# Patient Record
Sex: FEMALE | Race: WHITE | NOT HISPANIC OR LATINO | Employment: FULL TIME | URBAN - METROPOLITAN AREA
[De-identification: names, ages, dates, MRNs, and addresses within clinical notes are randomized per-mention and may not be internally consistent; named-entity substitution may affect disease eponyms.]

---

## 2017-01-05 ENCOUNTER — OFFICE VISIT (OUTPATIENT)
Dept: FAMILY MEDICINE | Facility: CLINIC | Age: 62
End: 2017-01-05
Payer: COMMERCIAL

## 2017-01-05 VITALS
DIASTOLIC BLOOD PRESSURE: 72 MMHG | SYSTOLIC BLOOD PRESSURE: 120 MMHG | OXYGEN SATURATION: 100 % | TEMPERATURE: 97.6 F | HEIGHT: 66 IN | HEART RATE: 78 BPM | WEIGHT: 168 LBS | BODY MASS INDEX: 27 KG/M2

## 2017-01-05 DIAGNOSIS — I10 BENIGN ESSENTIAL HYPERTENSION: Primary | ICD-10-CM

## 2017-01-05 PROCEDURE — 36415 COLL VENOUS BLD VENIPUNCTURE: CPT | Performed by: PREVENTIVE MEDICINE

## 2017-01-05 PROCEDURE — 80048 BASIC METABOLIC PNL TOTAL CA: CPT | Performed by: PREVENTIVE MEDICINE

## 2017-01-05 PROCEDURE — 99214 OFFICE O/P EST MOD 30 MIN: CPT | Performed by: PREVENTIVE MEDICINE

## 2017-01-05 NOTE — MR AVS SNAPSHOT
"              After Visit Summary   1/5/2017    Josy Kc    MRN: [id number]           Patient Information     Date Of Birth          1955        Visit Information        Provider Department      1/5/2017 2:00 PM Eleno Mcneill MD [facility]         Follow-ups after your visit        Who to contact     If you have questions or need follow up information about today's clinic visit or your schedule please contact [facility] directly at [phone].  Normal or non-critical lab and imaging results will be communicated to you by MyChart, letter or phone within 4 business days after the clinic has received the results. If you do not hear from us within 7 days, please contact the clinic through [employer] or phone. If you have a critical or abnormal lab result, we will notify you by phone as soon as possible.  Submit refill requests through [employer] or call your pharmacy and they will forward the refill request to us. Please allow 3 business days for your refill to be completed.          Additional Information About Your Visit        MyChart Information     [employer] gives you secure access to your electronic health record. If you see a primary care provider, you can also send messages to your care team and make appointments. If you have questions, please call your primary care clinic.  If you do not have a primary care provider, please call [phone] and they will assist you.        Care EveryWhere ID     This is your Care EveryWhere ID. This could be used by other organizations to access your [place] medical records  [id number]        Your Vitals Were     Pulse Temperature Height BMI (Body Mass Index) Pulse Oximetry Breastfeeding?    78 97.6  F (36.4  C) (Oral) 5' 5.75\" (1.67 m) 27.32 kg/m2 100% No       Blood Pressure from Last 3 Encounters:   01/05/17 120/72   11/16/16 162/84   11/11/16 160/80    Weight from Last 3 Encounters:   01/05/17 168 lb (76.204 kg) "   11/16/16 171 lb (77.565 kg)   11/11/16 172 lb (78.019 kg)              Today, you had the following     No orders found for display       Primary Care Provider Office Phone # Fax #    Eleno Yanes Vijay Mcneill [phone]       [facility] [address] [facility] [address]  [facility] [place] [address]        Thank you!     Thank you for choosing [facility]  for your care. Our goal is always to provide you with excellent care. Hearing back from our patients is one way we can continue to improve our services. Please take a few minutes to complete the written survey that you may receive in the mail after your visit with us. Thank you!             Your Updated Medication List - Protect others around you: Learn how to safely use, store and throw away your medicines at www.disposemymeds.org.          This list is accurate as of: 1/5/17  2:11 PM.  Always use your most recent med list.                   Brand Name Dispense Instructions for use    lisinopril-hydrochlorothiazide 20-12.5 MG per tablet    PRINZIDE/ZESTORETIC    90 tablet    TAKE 1 TABLET BY MOUTH EVERY DAY

## 2017-01-05 NOTE — PROGRESS NOTES
"SUBJECTIVE:  Josy Kc, a 61 year old female scheduled an appointment to discuss the following issues:  Benign essential hypertension  Pt here for follow up feels fine    Medical, social, surgical, and family histories reviewed.    ROS:  C: NEGATIVE for fever, chills  E: NEGATIVE for vision changes   R: NEGATIVE for significant cough or SOB  CV: NEGATIVE for chest pain, palpitations   GI: NEGATIVE for nausea, abdominal pain, heartburn, or change in bowel habits  : NEGATIVE for frequency, dysuria, or hematuria  M: NEGATIVE for significant arthralgias or myalgia  N: NEGATIVE for weakness, dizziness or paresthesias or headache    OBJECTIVE:  /72 mmHg  Pulse 78  Temp(Src) 97.6  F (36.4  C) (Oral)  Ht 5' 5.75\" (1.67 m)  Wt 168 lb (76.204 kg)  BMI 27.32 kg/m2  SpO2 100%  Breastfeeding? No  EXAM:  GENERAL APPEARANCE: healthy, alert and no distress  EYES: EOMI,  PERRL  HENT: ear canals and TM's normal and nose and mouth without ulcers or lesions  RESP: lungs clear to auscultation - no rales, rhonchi or wheezes  CV: regular rates and rhythm, normal S1 S2, no S3 or S4 and no murmur, click or rub -  ABDOMEN:  soft, nontender, no HSM or masses and bowel sounds normal    ASSESSMENT/PLAN:  (I10) Benign essential hypertension  (primary encounter diagnosis)  Plan: Basic metabolic panel  Recheck bmp today  Controlled, continue current medicine - lisinopril-hctz (20-12.5) daily    Advised mammogram, dexa - declined by pt    25 minutes spent with patient, over 50% time counseling, coordinating care and explaining about nature of the patient's conditions.    All risks, benefits of treatment and further evaluation was reviewed with patient.  Pt expressed understanding.  Pt was in agreement with this plan.  Eleno Mcneill MD    "

## 2017-01-05 NOTE — NURSING NOTE
"Chief Complaint   Patient presents with     Hypertension       Initial /72 mmHg  Pulse 78  Temp(Src) 97.6  F (36.4  C) (Oral)  Ht 5' 5.75\" (1.67 m)  Wt 168 lb (76.204 kg)  BMI 27.32 kg/m2  SpO2 100%  Breastfeeding? No Estimated body mass index is 27.32 kg/(m^2) as calculated from the following:    Height as of this encounter: 5' 5.75\" (1.67 m).    Weight as of this encounter: 168 lb (76.204 kg).  BP completed using cuff size: regular right arm  Chayo Garfield- CMA      "

## 2017-01-06 LAB
ANION GAP SERPL CALCULATED.3IONS-SCNC: 8 MMOL/L (ref 3–14)
BUN SERPL-MCNC: 24 MG/DL (ref 7–30)
CALCIUM SERPL-MCNC: 9 MG/DL (ref 8.5–10.1)
CHLORIDE SERPL-SCNC: 101 MMOL/L (ref 94–109)
CO2 SERPL-SCNC: 30 MMOL/L (ref 20–32)
CREAT SERPL-MCNC: 1.18 MG/DL (ref 0.52–1.04)
GFR SERPL CREATININE-BSD FRML MDRD: 46 ML/MIN/1.7M2
GLUCOSE SERPL-MCNC: 92 MG/DL (ref 70–99)
POTASSIUM SERPL-SCNC: 3.7 MMOL/L (ref 3.4–5.3)
SODIUM SERPL-SCNC: 139 MMOL/L (ref 133–144)

## 2017-01-07 ENCOUNTER — TELEPHONE (OUTPATIENT)
Dept: FAMILY MEDICINE | Facility: CLINIC | Age: 62
End: 2017-01-07

## 2017-01-07 DIAGNOSIS — I10 ESSENTIAL HYPERTENSION: Primary | ICD-10-CM

## 2017-01-07 RX ORDER — LISINOPRIL/HYDROCHLOROTHIAZIDE 10-12.5 MG
1 TABLET ORAL DAILY
Qty: 90 TABLET | Refills: 0 | Status: SHIPPED | OUTPATIENT
Start: 2017-01-07 | End: 2017-02-03

## 2017-01-09 RX ORDER — HYDROCHLOROTHIAZIDE 12.5 MG/1
12.5 TABLET ORAL DAILY
Qty: 90 TABLET | Refills: 1 | Status: SHIPPED | OUTPATIENT
Start: 2017-01-09 | End: 2017-02-03

## 2017-01-24 ENCOUNTER — TELEPHONE (OUTPATIENT)
Dept: FAMILY MEDICINE | Facility: CLINIC | Age: 62
End: 2017-01-24

## 2017-01-24 DIAGNOSIS — I10 BENIGN ESSENTIAL HYPERTENSION: Primary | ICD-10-CM

## 2017-01-24 NOTE — TELEPHONE ENCOUNTER
Reason for Call:  Other call back    Detailed comments: Pt is requesting lab orders for Creatine, BUM and sodium. States according to her mychart it said she needs these to be drawn. Pt is coming in tomorrow for bp check and is hoping to get these done as well    Phone Number Patient can be reached at: Home number on file [phone] (home)    Best Time: anytime    Can we leave a detailed message on this number? YES    Call taken on 1/24/2017 at 1:11 PM by Enriqueta Forte

## 2017-01-25 ENCOUNTER — TRANSFERRED RECORDS (OUTPATIENT)
Dept: HEALTH INFORMATION MANAGEMENT | Facility: CLINIC | Age: 62
End: 2017-01-25

## 2017-01-25 ENCOUNTER — ALLIED HEALTH/NURSE VISIT (OUTPATIENT)
Dept: NURSING | Facility: CLINIC | Age: 62
End: 2017-01-25
Payer: COMMERCIAL

## 2017-01-25 VITALS
DIASTOLIC BLOOD PRESSURE: 83 MMHG | HEIGHT: 68 IN | TEMPERATURE: 97.9 F | OXYGEN SATURATION: 100 % | HEART RATE: 72 BPM | SYSTOLIC BLOOD PRESSURE: 148 MMHG | BODY MASS INDEX: 25.07 KG/M2 | WEIGHT: 165.4 LBS

## 2017-01-25 DIAGNOSIS — I10 BENIGN ESSENTIAL HYPERTENSION: ICD-10-CM

## 2017-01-25 LAB
ANION GAP SERPL CALCULATED.3IONS-SCNC: 9 MMOL/L (ref 3–14)
BUN SERPL-MCNC: 18 MG/DL (ref 7–30)
CALCIUM SERPL-MCNC: 9.3 MG/DL (ref 8.5–10.1)
CHLORIDE SERPL-SCNC: 107 MMOL/L (ref 94–109)
CO2 SERPL-SCNC: 27 MMOL/L (ref 20–32)
CREAT SERPL-MCNC: 0.96 MG/DL (ref 0.52–1.04)
GFR SERPL CREATININE-BSD FRML MDRD: 59 ML/MIN/1.7M2
GLUCOSE SERPL-MCNC: 99 MG/DL (ref 70–99)
POTASSIUM SERPL-SCNC: 4.5 MMOL/L (ref 3.4–5.3)
SODIUM SERPL-SCNC: 143 MMOL/L (ref 133–144)

## 2017-01-25 PROCEDURE — 80048 BASIC METABOLIC PNL TOTAL CA: CPT | Performed by: PREVENTIVE MEDICINE

## 2017-01-25 PROCEDURE — 36415 COLL VENOUS BLD VENIPUNCTURE: CPT | Performed by: PREVENTIVE MEDICINE

## 2017-01-25 PROCEDURE — 99207 ZZC NO CHARGE NURSE ONLY: CPT

## 2017-02-03 ENCOUNTER — OFFICE VISIT (OUTPATIENT)
Dept: FAMILY MEDICINE | Facility: CLINIC | Age: 62
End: 2017-02-03
Payer: COMMERCIAL

## 2017-02-03 VITALS
BODY MASS INDEX: 25.82 KG/M2 | OXYGEN SATURATION: 99 % | HEIGHT: 68 IN | SYSTOLIC BLOOD PRESSURE: 130 MMHG | WEIGHT: 170.4 LBS | TEMPERATURE: 97.9 F | HEART RATE: 79 BPM | DIASTOLIC BLOOD PRESSURE: 80 MMHG

## 2017-02-03 DIAGNOSIS — I10 BENIGN ESSENTIAL HYPERTENSION: Primary | ICD-10-CM

## 2017-02-03 PROCEDURE — 99214 OFFICE O/P EST MOD 30 MIN: CPT | Performed by: PREVENTIVE MEDICINE

## 2017-02-03 NOTE — MR AVS SNAPSHOT
"              After Visit Summary   2/3/2017    Josy Kc    MRN: [id number]           Patient Information     Date Of Birth          1955        Visit Information        Provider Department      2/3/2017 1:30 PM Eleno Mcneill MD [facility]        Today's Diagnoses     Visit for screening mammogram    -  1        Follow-ups after your visit        Who to contact     If you have questions or need follow up information about today's clinic visit or your schedule please contact [facility] directly at [phone].  Normal or non-critical lab and imaging results will be communicated to you by [employer], letter or phone within 4 business days after the clinic has received the results. If you do not hear from us within 7 days, please contact the clinic through [employer] or phone. If you have a critical or abnormal lab result, we will notify you by phone as soon as possible.  Submit refill requests through [employer] or call your pharmacy and they will forward the refill request to us. Please allow 3 business days for your refill to be completed.          Additional Information About Your Visit        MyChart Information     [employer] gives you secure access to your electronic health record. If you see a primary care provider, you can also send messages to your care team and make appointments. If you have questions, please call your primary care clinic.  If you do not have a primary care provider, please call [phone] and they will assist you.        Care EveryWhere ID     This is your Care EveryWhere ID. This could be used by other organizations to access your [place] medical records  [id number]        Your Vitals Were     Pulse Temperature Height BMI (Body Mass Index) Pulse Oximetry Breastfeeding?    79 97.9  F (36.6  C) (Oral) 5' 7.75\" (1.721 m) 26.10 kg/m2 99% No       Blood Pressure from Last 3 Encounters:   02/03/17 152/80   01/25/17 148/83   01/05/17 120/72 "    Weight from Last 3 Encounters:   02/03/17 170 lb 6.4 oz (77.293 kg)   01/25/17 165 lb 6.4 oz (75.025 kg)   01/05/17 168 lb (76.204 kg)              Today, you had the following     No orders found for display       Primary Care Provider Office Phone # Fax #    Eleno Yanes Vijay Mcneill [phone]       [facility] [address] [facility] [address] [facility] [address]  [facility] [address]        Thank you!     Thank you for choosing [facility]  for your care. Our goal is always to provide you with excellent care. Hearing back from our patients is one way we can continue to improve our services. Please take a few minutes to complete the written survey that you may receive in the mail after your visit with us. Thank you!             Your Updated Medication List - Protect others around you: Learn how to safely use, store and throw away your medicines at www.disposemymeds.org.          This list is accurate as of: 2/3/17  1:32 PM.  Always use your most recent med list.                   Brand Name Dispense Instructions for use    hydrochlorothiazide 12.5 MG Tabs tablet     90 tablet    Take 1 tablet (12.5 mg) by mouth daily       lisinopril-hydrochlorothiazide 10-12.5 MG per tablet    PRINZIDE/ZESTORETIC    90 tablet    Take 1 tablet by mouth daily

## 2017-02-03 NOTE — PROGRESS NOTES
"SUBJECTIVE:  Josy Kc, a 61 year old female scheduled an appointment to discuss the following issues:  Benign essential hypertension  Pt here for follow up  BP at home has been 130/80 on average off meds    Medical, social, surgical, and family histories reviewed.    ROS:  C: NEGATIVE for fever, chills  E: NEGATIVE for vision changes   R: NEGATIVE for significant cough or SOB  CV: NEGATIVE for chest pain, palpitations   GI: NEGATIVE for nausea, abdominal pain, heartburn, or change in bowel habits  : NEGATIVE for frequency, dysuria, or hematuria  M: NEGATIVE for significant arthralgias or myalgia  N: NEGATIVE for weakness, dizziness or paresthesias or headache    OBJECTIVE:  /80 mmHg  Pulse 79  Temp(Src) 97.9  F (36.6  C) (Oral)  Ht 5' 7.75\" (1.721 m)  Wt 170 lb 6.4 oz (77.293 kg)  BMI 26.10 kg/m2  SpO2 99%  Breastfeeding? No  EXAM:  GENERAL APPEARANCE: healthy, alert and no distress  EYES: EOMI,  PERRL  HENT: ear canals and TM's normal and nose and mouth without ulcers or lesions  RESP: lungs clear to auscultation - no rales, rhonchi or wheezes  CV: regular rates and rhythm, normal S1 S2, no S3 or S4 and no murmur, click or rub -  ABDOMEN:  soft, nontender, no HSM or masses and bowel sounds normal    ASSESSMENT/PLAN:  (I10) Benign essential hypertension  (primary encounter diagnosis)  bp stable off meds, advise continued monitoring, contact me in bp goes above 140/90 on average  DASH diet  Regular cardiovascular exercise      25 minutes spent with patient, over 50% time counseling, coordinating care and explaining about nature of the patient's conditions.    All risks, benefits of treatment and further evaluation was reviewed with patient.  Pt expressed understanding.  Pt was in agreement with this plan.  Eleno Mcneill MD    "

## 2017-02-03 NOTE — NURSING NOTE
"Chief Complaint   Patient presents with     Follow Up For     Blood Pressure       Initial /80 mmHg  Pulse 79  Temp(Src) 97.9  F (36.6  C) (Oral)  Ht 5' 7.75\" (1.721 m)  Wt 170 lb 6.4 oz (77.293 kg)  BMI 26.10 kg/m2  SpO2 99%  Breastfeeding? No Estimated body mass index is 26.1 kg/(m^2) as calculated from the following:    Height as of this encounter: 5' 7.75\" (1.721 m).    Weight as of this encounter: 170 lb 6.4 oz (77.293 kg).  BP completed using cuff size: large(L)    KATIE Klein MA February 3, 2017 1:30 PM      "

## 2017-02-21 ENCOUNTER — TRANSFERRED RECORDS (OUTPATIENT)
Dept: HEALTH INFORMATION MANAGEMENT | Facility: CLINIC | Age: 62
End: 2017-02-21

## 2017-02-27 ENCOUNTER — OFFICE VISIT (OUTPATIENT)
Dept: FAMILY MEDICINE | Facility: CLINIC | Age: 62
End: 2017-02-27
Payer: COMMERCIAL

## 2017-02-27 VITALS
SYSTOLIC BLOOD PRESSURE: 158 MMHG | BODY MASS INDEX: 27.48 KG/M2 | HEIGHT: 66 IN | WEIGHT: 171 LBS | DIASTOLIC BLOOD PRESSURE: 95 MMHG | TEMPERATURE: 97.4 F | OXYGEN SATURATION: 100 % | HEART RATE: 83 BPM

## 2017-02-27 DIAGNOSIS — R07.89 CHEST HEAVINESS: Primary | ICD-10-CM

## 2017-02-27 DIAGNOSIS — I10 BENIGN ESSENTIAL HYPERTENSION: ICD-10-CM

## 2017-02-27 PROCEDURE — 99213 OFFICE O/P EST LOW 20 MIN: CPT | Performed by: NURSE PRACTITIONER

## 2017-02-27 NOTE — MR AVS SNAPSHOT
After Visit Summary   2/27/2017    Josy Kc    MRN: [id number]           Patient Information     Date Of Birth          1955        Visit Information        Provider Department      2/27/2017 2:00 PM Char Zhang APRN CNP [facility]        Care Instructions    Begin taking zantac 150mg twice a day for a couple of months  I suggest taking a low dose of lisinopril 10mg daily and follow up with Dr Mcneill in one month  Also call me if you find you need a new prescription           Follow-ups after your visit        Who to contact     If you have questions or need follow up information about today's clinic visit or your schedule please contact [facility] directly at [phone].  Normal or non-critical lab and imaging results will be communicated to you by [employer]hart, letter or phone within 4 business days after the clinic has received the results. If you do not hear from us within 7 days, please contact the clinic through [employer]hart or phone. If you have a critical or abnormal lab result, we will notify you by phone as soon as possible.  Submit refill requests through [employer] or call your pharmacy and they will forward the refill request to us. Please allow 3 business days for your refill to be completed.          Additional Information About Your Visit        MyChart Information     [employer] gives you secure access to your electronic health record. If you see a primary care provider, you can also send messages to your care team and make appointments. If you have questions, please call your primary care clinic.  If you do not have a primary care provider, please call [phone] and they will assist you.        Care EveryWhere ID     This is your Care EveryWhere ID. This could be used by other organizations to access your [place] medical records  [id number]        Your Vitals Were     Pulse Temperature Height Pulse Oximetry BMI (Body Mass Index)       83 97.4  " F (36.3  C) (Tympanic) 5' 5.75\" (1.67 m) 100% 27.81 kg/m2        Blood Pressure from Last 3 Encounters:   02/27/17 (!) 158/95   02/03/17 130/80   01/25/17 148/83    Weight from Last 3 Encounters:   02/27/17 171 lb (77.6 kg)   02/03/17 170 lb 6.4 oz (77.3 kg)   01/25/17 165 lb 6.4 oz (75 kg)              Today, you had the following     No orders found for display       Primary Care Provider Office Phone # Fax #    Eleno Joaquín Mcneill [phone]       [facility] [address] [address]        Thank you!     Thank you for choosing [facility]  for your care. Our goal is always to provide you with excellent care. Hearing back from our patients is one way we can continue to improve our services. Please take a few minutes to complete the written survey that you may receive in the mail after your visit with us. Thank you!             Your Updated Medication List - Protect others around you: Learn how to safely use, store and throw away your medicines at www.disposemymeds.org.      Notice  As of 2/27/2017  2:36 PM    You have not been prescribed any medications.      "

## 2017-02-27 NOTE — PROGRESS NOTES
SUBJECTIVE:                                                    Josy Kc is a 61 year old female who presents to clinic today for the following health issues:      Chest Heaviness      Onset: since November    Description (location/character/radiation/duration): between breasts, 2 to 10 times per day and lasts 2 min to 2 hours; no chest heaviness currently    Intensity:  mild    Accompanying signs and symptoms:        Shortness of breath: no        Sweating: no        Nausea/vomitting: no        Palpitations: YES       Other (fevers/chills/cough/heartburn/lightheadedness): no     History (similar episodes/previous evaluation):hypertension currently on no anithypertensives .  Was taking combo lisinopril /HCTZ  10/12.5 but dropped her BP too low and recently outside the clinic she feels her BP is in normal range so has discontinued med     Precipitating or alleviating factors:       Worse with exertion: no ; in fact an avid exerciser and has no limitations, or exercise induced chest heaviness, pain or SOB; no hx of asthma         Worse with breathing: YES- when taking deep breaths       Related to eating: no        Better with burping: no     Therapies tried and outcome: None       Problem list and histories reviewed & adjusted, as indicated.  Additional history: as documented    Patient Active Problem List   Diagnosis     CARDIOVASCULAR SCREENING; LDL GOAL LESS THAN 160     S/P hysterectomy     Benign essential hypertension     Past Surgical History   Procedure Laterality Date     Hysterectomy, pap no longer indicated       C nonspecific procedure       vein stripping     Hysterectomy, pap no longer indicated         Social History   Substance Use Topics     Smoking status: Former Smoker     Packs/day: 1.00     Years: 10.00     Types: Cigarettes     Smokeless tobacco: Never Used     Alcohol use 0.0 oz/week     0 Standard drinks or equivalent per week      Comment: 1 drink 5 days per week     Family  "History   Problem Relation Age of Onset     DIABETES Sister      C.A.D. Mother       chf, 97     Hypertension Mother      Neurologic Disorder Father       60, aneurysm     CANCER Paternal Aunt      ?ovarian/colon     CANCER Paternal Aunt      ?ovarian/colon     CANCER Paternal Aunt      ?ovarian/colon     Breast Cancer Sister          No current outpatient prescriptions on file.     Allergies   Allergen Reactions     No Known Allergies        ROS:  Constitutional, HEENT, cardiovascular, pulmonary, gi and gu systems are negative, except as otherwise noted.    OBJECTIVE:                                                    BP (!) 158/95  Pulse 83  Temp 97.4  F (36.3  C) (Tympanic)  Ht 5' 5.75\" (1.67 m)  Wt 171 lb (77.6 kg)  SpO2 100%  BMI 27.81 kg/m2  Body mass index is 27.81 kg/(m^2).  GENERAL: healthy, alert and no distress,EYES: Eyes grossly normal to inspection, PERRL and conjunctivae and sclerae normal  HENT: ear canals and TM's normal, nose and mouth without ulcers or lesions  NECK: no adenopathy, no asymmetry, masses, or scars and thyroid normal to palpation  RESP: lungs clear to auscultation - no rales, rhonchi or wheezes  CV: regular rate and rhythm, normal S1 S2, no S3 or S4, no murmur, click or rub, no peripheral edema and peripheral pulses strong  ABDOMEN: soft, nontender, no hepatosplenomegaly, no masses and bowel sounds normal  MS: no gross musculoskeletal defects noted, no edema  PSYCH: mentation appears normal, affect normal/  Diagnostic Test Results:  none      ASSESSMENT/PLAN:                                                        ICD-10-CM    1. Chest heaviness R07.89    2. Benign essential hypertension I10    I suggested a 24 hour ambulatory monitor but she has checked into cost and because she could not be given a definite cost she has decided not to do it  She exercises without cardiac symptoms and has no pulmonary symptoms  I suspect this is related to either reflux or uncontrolled " hypertension  Patient Instructions   Begin taking zantac 150mg twice a day for a couple of months- pt declines to do this at this time  I suggest taking a low dose of lisinopril 10mg daily and follow up with Dr Mcneill in one month  Also call me if you find you need a new prescription   Please follow up with  once you have resumed low dose lisinopril and / or zantac or if symtoms worsen or change      NATO [facility]

## 2017-02-27 NOTE — NURSING NOTE
"Chief Complaint   Patient presents with     Chest Pain       Initial BP (!) 158/95  Pulse 83  Temp 97.4  F (36.3  C) (Tympanic)  Ht 5' 5.75\" (1.67 m)  Wt 171 lb (77.6 kg)  SpO2 100%  BMI 27.81 kg/m2 Estimated body mass index is 27.81 kg/(m^2) as calculated from the following:    Height as of this encounter: 5' 5.75\" (1.67 m).    Weight as of this encounter: 171 lb (77.6 kg).  Medication Reconciliation: complete   Shahida Harris MA    "

## 2017-02-27 NOTE — PATIENT INSTRUCTIONS
Begin taking zantac 150mg twice a day for a couple of months  I suggest taking a low dose of lisinopril 10mg daily and follow up with Dr Mcneill in one month  Also call me if you find you need a new prescription

## 2020-03-02 ENCOUNTER — HEALTH MAINTENANCE LETTER (OUTPATIENT)
Age: 65
End: 2020-03-02

## 2020-12-20 ENCOUNTER — HEALTH MAINTENANCE LETTER (OUTPATIENT)
Age: 65
End: 2020-12-20

## 2021-04-24 ENCOUNTER — HEALTH MAINTENANCE LETTER (OUTPATIENT)
Age: 66
End: 2021-04-24

## 2021-10-03 ENCOUNTER — HEALTH MAINTENANCE LETTER (OUTPATIENT)
Age: 66
End: 2021-10-03

## 2021-10-28 ENCOUNTER — APPOINTMENT (OUTPATIENT)
Dept: URBAN - METROPOLITAN AREA CLINIC 259 | Age: 66
Setting detail: DERMATOLOGY
End: 2021-10-29

## 2021-10-28 DIAGNOSIS — L81.4 OTHER MELANIN HYPERPIGMENTATION: ICD-10-CM

## 2021-10-28 DIAGNOSIS — L82.1 OTHER SEBORRHEIC KERATOSIS: ICD-10-CM

## 2021-10-28 DIAGNOSIS — L57.8 OTHER SKIN CHANGES DUE TO CHRONIC EXPOSURE TO NONIONIZING RADIATION: ICD-10-CM

## 2021-10-28 PROCEDURE — OTHER PRESCRIPTION MEDICATION MANAGEMENT: OTHER

## 2021-10-28 PROCEDURE — OTHER COUNSELING: OTHER

## 2021-10-28 PROCEDURE — OTHER MIPS QUALITY: OTHER

## 2021-10-28 PROCEDURE — 99203 OFFICE O/P NEW LOW 30 MIN: CPT

## 2021-10-28 ASSESSMENT — LOCATION ZONE DERM
LOCATION ZONE: TRUNK
LOCATION ZONE: FACE

## 2021-10-28 ASSESSMENT — LOCATION DETAILED DESCRIPTION DERM
LOCATION DETAILED: RIGHT CENTRAL MALAR CHEEK
LOCATION DETAILED: LEFT RIB CAGE
LOCATION DETAILED: LEFT CENTRAL MALAR CHEEK
LOCATION DETAILED: EPIGASTRIC SKIN

## 2021-10-28 ASSESSMENT — LOCATION SIMPLE DESCRIPTION DERM
LOCATION SIMPLE: LEFT CHEEK
LOCATION SIMPLE: RIGHT CHEEK
LOCATION SIMPLE: ABDOMEN

## 2021-10-28 NOTE — PROCEDURE: MIPS QUALITY
Quality 130: Documentation Of Current Medications In The Medical Record: Current Medications Documented
Quality 110: Preventive Care And Screening: Influenza Immunization: Influenza Immunization Ordered or Recommended, but not Administered due to system reason
Quality 431: Preventive Care And Screening: Unhealthy Alcohol Use - Screening: Patient not identified as an unhealthy alcohol user when screened for unhealthy alcohol use using a systematic screening method
Quality 226: Preventive Care And Screening: Tobacco Use: Screening And Cessation Intervention: Patient screened for tobacco use and is an ex/non-smoker
Detail Level: Detailed

## 2021-10-28 NOTE — PROCEDURE: PRESCRIPTION MEDICATION MANAGEMENT
Detail Level: Zone
Render In Strict Bullet Format?: No
Initiate Treatment: [place] compound (Hydroquinone 10%/Kojic Acid 5%, Tretinoin 0.1%, Hydrocortisone 2.5%) apply everyday x30 days, then every other day x30 days

## 2022-03-20 ENCOUNTER — HEALTH MAINTENANCE LETTER (OUTPATIENT)
Age: 67
End: 2022-03-20

## 2022-05-15 ENCOUNTER — HEALTH MAINTENANCE LETTER (OUTPATIENT)
Age: 67
End: 2022-05-15

## 2022-09-10 ENCOUNTER — HEALTH MAINTENANCE LETTER (OUTPATIENT)
Age: 67
End: 2022-09-10

## 2023-01-22 ENCOUNTER — HEALTH MAINTENANCE LETTER (OUTPATIENT)
Age: 68
End: 2023-01-22

## 2023-04-06 ENCOUNTER — APPOINTMENT (OUTPATIENT)
Dept: URBAN - METROPOLITAN AREA CLINIC 256 | Age: 68
Setting detail: DERMATOLOGY
End: 2023-04-07

## 2023-04-06 VITALS — HEIGHT: 66 IN | WEIGHT: 180 LBS

## 2023-04-06 DIAGNOSIS — L20.89 OTHER ATOPIC DERMATITIS: ICD-10-CM

## 2023-04-06 PROBLEM — L20.84 INTRINSIC (ALLERGIC) ECZEMA: Status: ACTIVE | Noted: 2023-04-06

## 2023-04-06 PROCEDURE — 99213 OFFICE O/P EST LOW 20 MIN: CPT

## 2023-04-06 PROCEDURE — OTHER PRESCRIPTION MEDICATION MANAGEMENT: OTHER

## 2023-04-06 PROCEDURE — OTHER COUNSELING: OTHER

## 2023-04-06 PROCEDURE — OTHER EDUCATIONAL RESOURCES PROVIDED: OTHER

## 2023-04-06 PROCEDURE — OTHER MIPS QUALITY: OTHER

## 2023-04-06 PROCEDURE — OTHER PRESCRIPTION: OTHER

## 2023-04-06 RX ORDER — CLOBETASOL PROPIONATE 0.5 MG/G
CREAM TOPICAL BID
Qty: 60 | Refills: 2 | Status: ERX | COMMUNITY
Start: 2023-04-06

## 2023-04-06 ASSESSMENT — BSA RASH: BSA RASH: 3

## 2023-04-06 NOTE — PROCEDURE: PRESCRIPTION MEDICATION MANAGEMENT
Detail Level: Zone
Continue Regimen: Using gentle cleansers and heavy moisturizers and creams.
Render In Strict Bullet Format?: No
Samples Given: Cetaphil eczema cream, moisturizer and Cerave gentle soaps.
Initiate Treatment: clobetasol 0.05 % topical cream - Apply to affected areas on hands twice a day until clear. Then use as needed for maintenance.

## 2023-04-06 NOTE — PROCEDURE: MIPS QUALITY
Quality 226: Preventive Care And Screening: Tobacco Use: Screening And Cessation Intervention: Patient screened for tobacco use and is an ex/non-smoker
Quality 130: Documentation Of Current Medications In The Medical Record: Current Medications Documented
Quality 431: Preventive Care And Screening: Unhealthy Alcohol Use - Screening: Patient not identified as an unhealthy alcohol user when screened for unhealthy alcohol use using a systematic screening method
Quality 47: Advance Care Plan: Advance Care Planning discussed and documented in the medical record; patient did not wish or was not able to name a surrogate decision maker or provide an advance care plan.
Quality 111:Pneumonia Vaccination Status For Older Adults: Pneumococcal vaccine (PPSV23) administered on or after patient’s 60th birthday and before the end of the measurement period
Quality 110: Preventive Care And Screening: Influenza Immunization: Influenza Immunization Administered during Influenza season
Detail Level: Detailed

## 2023-04-06 NOTE — HPI: RASH
What Type Of Note Output Would You Prefer (Optional)?: Standard Output
Is This A New Presentation, Or A Follow-Up?: Rash
Additional History: Dove body wash \\nGold bond eczema cream

## 2024-04-25 ENCOUNTER — TRANSFERRED RECORDS (OUTPATIENT)
Dept: HEALTH INFORMATION MANAGEMENT | Facility: CLINIC | Age: 69
End: 2024-04-25

## 2024-05-13 NOTE — TELEPHONE ENCOUNTER
FUTURE VISIT INFORMATION      FUTURE VISIT INFORMATION:  Date: 8/12/24  Time: 8:00am  [place] [facility]  [place]ERR[place] INFORMATION:  Referring provider:    Referring providers clinic:    Reason for visit/diagnosis  Droopy Eyelid     RECORDS REQUESTED FROM:       Clinic name Comments Records Status Imaging Status                                          Referral being sent over

## 2024-07-16 NOTE — TELEPHONE ENCOUNTER
FUTURE VISIT INFORMATION      FUTURE VISIT INFORMATION:  Date: 9/16/24  Time: 9:00am  Location[place] [facility]  REFERRAL INFORMATION:  Referring provider:    Referring providers clinic:    Reason for visit/diagnosis  Droopy Eyelid      RECORDS REQUESTED FROM:         Clinic name Comments Records Status Imaging Status    [place] [place]  Fax: [phone]  received and sent to [facility]  EPIC

## 2024-08-12 ENCOUNTER — PRE VISIT (OUTPATIENT)
Dept: OPHTHALMOLOGY | Facility: CLINIC | Age: 69
End: 2024-08-12

## 2024-09-11 ENCOUNTER — TELEPHONE (OUTPATIENT)
Dept: OPHTHALMOLOGY | Facility: CLINIC | Age: 69
End: 2024-09-11
Payer: COMMERCIAL

## 2024-09-16 ENCOUNTER — PRE VISIT (OUTPATIENT)
Dept: OPHTHALMOLOGY | Facility: CLINIC | Age: 69
End: 2024-09-16

## 2024-09-16 ENCOUNTER — OFFICE VISIT (OUTPATIENT)
Dept: OPHTHALMOLOGY | Facility: CLINIC | Age: 69
End: 2024-09-16
Payer: COMMERCIAL

## 2024-09-16 DIAGNOSIS — H02.423 MYOGENIC PTOSIS OF EYELID OF BOTH EYES: ICD-10-CM

## 2024-09-16 DIAGNOSIS — H02.834 DERMATOCHALASIS OF BOTH UPPER EYELIDS: Primary | ICD-10-CM

## 2024-09-16 DIAGNOSIS — H02.831 DERMATOCHALASIS OF BOTH UPPER EYELIDS: Primary | ICD-10-CM

## 2024-09-16 PROCEDURE — 92082 INTERMEDIATE VISUAL FIELD XM: CPT | Mod: GC | Performed by: OPHTHALMOLOGY

## 2024-09-16 PROCEDURE — 99204 OFFICE O/P NEW MOD 45 MIN: CPT | Mod: GC | Performed by: OPHTHALMOLOGY

## 2024-09-16 PROCEDURE — 92285 EXTERNAL OCULAR PHOTOGRAPHY: CPT | Mod: GC | Performed by: OPHTHALMOLOGY

## 2024-09-16 RX ORDER — HYDROCHLOROTHIAZIDE 25 MG/1
TABLET ORAL
COMMUNITY
Start: 2024-09-05

## 2024-09-16 ASSESSMENT — CONF VISUAL FIELD
OD_SUPERIOR_NASAL_RESTRICTION: 0
OD_INFERIOR_TEMPORAL_RESTRICTION: 0
OD_NORMAL: 1
OD_SUPERIOR_TEMPORAL_RESTRICTION: 0
OS_NORMAL: 1
OS_INFERIOR_NASAL_RESTRICTION: 0
OS_SUPERIOR_TEMPORAL_RESTRICTION: 0
OS_SUPERIOR_NASAL_RESTRICTION: 0
OS_INFERIOR_TEMPORAL_RESTRICTION: 0
METHOD: COUNTING FINGERS
OD_INFERIOR_NASAL_RESTRICTION: 0

## 2024-09-16 ASSESSMENT — VISUAL ACUITY
OS_CC+: +2
OS_CC: 20/50
CORRECTION_TYPE: CONTACTS
METHOD: SNELLEN - LINEAR
OD_CC: 20/20

## 2024-09-16 ASSESSMENT — MARGIN REFLEX DISTANCE
OD_MRD1: 2
OS_MRD1: 2

## 2024-09-16 ASSESSMENT — TONOMETRY
OD_IOP_MMHG: 23
OS_IOP_MMHG: 23
IOP_METHOD: ICARE

## 2024-09-16 ASSESSMENT — LAGOPHTHALMOS
OD_LAGOPHTHALMOS: 0
OS_LAGOPHTHALMOS: 0

## 2024-09-16 ASSESSMENT — LEVATOR FUNCTION
OD_LEVATOR: 15
OS_LEVATOR: 15

## 2024-09-16 NOTE — NURSING NOTE
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  Negative for eye pain    Pain scale: 0/10              Comments    Pt notes that she has droopy Upper eyelids, gradually worsening overtime, states if she uses brows/forehead to lift eyelids she is able to see more.     Marilee SANDOVAL September 16, 2024 8:54 AM

## 2024-09-16 NOTE — PROGRESS NOTES
Chief Complaints and History of Present Illnesses   Patient presents with    Droopy Eye Lid Evaluation     Chief Complaint(s) and History of Present Illness(es)     Droopy Eye Lid Evaluation    In right upper lid and left upper lid.  Associated signs and symptoms   include dry eyes (pt notes she is a CTL wearing, (soft), occurs when   wearing long periods of time.).  Negative for eye pain.  Pain was noted as   0/10.           Comments    Pt notes that she has droopy Upper eyelids, gradually worsening overtime,   states if she uses brows/forehead to lift eyelids she is able to see more.       Marilee Husam COT September 16, 2024 8:54 AM                 Provider HPI: No eye problems except for dry eyes. She says this is mild and only when wearing contacts. She does not use artificial tears.     She also has allergic conjunctivitis. She uses Pataday during allergy season.    She has high blood pressure and takes HCTZ. No heart attacks, strokes, or blood clots. No blood thinning medications. Does not smoke. No fish oil/vitamin E.        FUNCTIONAL COMPLAINTS RELATED TO DROOPY EYELIDS/BROWS:  Josy Kc describes raising her eye brows throughout the day to improve the position of her upper eyelids. If she does not, she develops drooping that interferes with superior visual field and activities of daily living including reading, driving and watching television.    EXAM:   Dominant eye: she has monovision with right eye set for distance    MRD1: Right eye 2   Left eye 2  Dermatochalasis with excess skin touching eyelashes       VISUAL FIELD:  Right eye untaped: 22 degrees Right eye taped:42 degrees  Left eye untaped:28 degrees Left eye taped:45 degrees    Right eye visual field improves by: 20 degrees  Left eye visual field improves by: 17 degrees        Assessment & Plan     Josy Kc is a 69 year old female with the following diagnoses:   1. Dermatochalasis of both upper eyelids    2. Myogenic  ptosis of eyelid of both eyes         Recommend bilateral upper blepharoplasty- GAEL Guo MD  Resident Physician, PGY-2  Department of Ophthalmology  09/16/2024 9:16 AM      Attending Physician Attestation:  I have seen and examined this patient with the resident .  I have confirmed and edited as necessary the chief complaint(s), history of present illness, review of systems, relevant history, and examination findings as documented by others.  I have personally reviewed the relevant tests, images, and reports as documented above.  I have confirmed and edited as necessary the assessment and plan and agree with this note.    - Allan Martin MD 9:42 AM 9/16/2024    Today with Josy Kc, I reviewed the indications, risks, benefits, and alternatives of the proposed surgical procedure including, but not limited to, failure obtain the desired result  and need for additional surgery, bleeding, infection, loss of vision, loss of the eye, and the remote possibility of permanent damage to any organ system or death with the use of anesthesia.  I provided multiple opportunities for the questions, answered all questions to the best of my ability, and confirmed that my answers and my discussion were understood.     - Allan Martin MD 9:42 AM 9/16/2024

## 2024-09-16 NOTE — LETTER
2024         RE:  :  MRN: Josy Kc  1955  [id number]     Dear Dr. Gaspar,     Thank you for asking me to see your patient, Josy Kc, for an oculoplastic   consultation.  My assessment and plan are below.  For further details, please see my attached clinic note.        Assessment & Plan     Josy Kc is a 69 year old female with the following diagnoses:   1. Dermatochalasis of both upper eyelids    2. Myogenic ptosis of eyelid of both eyes       Recommend bilateral upper blepharoplasty- SON    Again, thank you for allowing me to participate in the care of your patient.      Sincerely,    Allan Martin MD  Department of Ophthalmology and Visual Neurosciences  [facility]    CC: Brandon Gaspar, NUNU  [facility] [address]  [facility] [address]  Via Fax: [phone]

## 2024-10-03 ENCOUNTER — TELEPHONE (OUTPATIENT)
Dept: OPHTHALMOLOGY | Facility: CLINIC | Age: 69
End: 2024-10-03
Payer: COMMERCIAL

## 2024-10-03 NOTE — TELEPHONE ENCOUNTER
Left voicemail for patient regarding scheduling surgery with Dr. Martin.  Provided contact number to discuss.  [phone]    Sheron Abraham, on 10/3/2024 at 6:41 PM

## 2024-10-08 ENCOUNTER — HOSPITAL ENCOUNTER (OUTPATIENT)
Facility: AMBULATORY SURGERY CENTER | Age: 69
End: 2024-10-08
Attending: OPHTHALMOLOGY
Payer: COMMERCIAL

## 2024-10-08 PROBLEM — H02.831 DERMATOCHALASIS OF BOTH UPPER EYELIDS: Status: ACTIVE | Noted: 2024-09-16

## 2024-10-08 PROBLEM — H02.834 DERMATOCHALASIS OF BOTH UPPER EYELIDS: Status: ACTIVE | Noted: 2024-09-16

## 2024-10-08 NOTE — TELEPHONE ENCOUNTER
Called patient to schedule surgery with Dr. Martin    Spoke with: Josy    Date(s) of Surgery: 5/14    Patient aware of approximate arrival time: Yes      Location of surgery: [facility]     Pre-Op H&P: [facility]     Informed patient that they need to call to schedule pre-op H&P within 30 days of surgery date: Yes      Post-Op Appt Dates: 6/2       Discussed with patient pre-op RN will call 2-3 days prior to surgery with arrival time and instructions:  Yes       Standard Surgery Packet Sent: Yes 10/08/24  via [employer] Message      Additional Information Sent in Packet:  NA       Informed patient that they will need an adult  to bring patient home from surgery: Yes  : spouse         Additional Comments:  NA      All patients questions were answered and was instructed to review surgical packet and call back [phone] with any questions or concerns.       Sheron Abraham on 10/8/2024 at 1:57 PM

## 2025-06-07 ENCOUNTER — HEALTH MAINTENANCE LETTER (OUTPATIENT)
Age: 70
End: 2025-06-07